# Patient Record
Sex: FEMALE | Race: ASIAN | NOT HISPANIC OR LATINO | Employment: OTHER | URBAN - METROPOLITAN AREA
[De-identification: names, ages, dates, MRNs, and addresses within clinical notes are randomized per-mention and may not be internally consistent; named-entity substitution may affect disease eponyms.]

---

## 2022-02-20 ENCOUNTER — APPOINTMENT (EMERGENCY)
Dept: RADIOLOGY | Facility: HOSPITAL | Age: 77
End: 2022-02-20
Payer: MEDICARE

## 2022-02-20 ENCOUNTER — HOSPITAL ENCOUNTER (EMERGENCY)
Facility: HOSPITAL | Age: 77
Discharge: HOME | End: 2022-02-20
Attending: EMERGENCY MEDICINE | Admitting: EMERGENCY MEDICINE
Payer: MEDICARE

## 2022-02-20 VITALS
RESPIRATION RATE: 18 BRPM | HEART RATE: 101 BPM | TEMPERATURE: 99.8 F | SYSTOLIC BLOOD PRESSURE: 138 MMHG | WEIGHT: 120 LBS | DIASTOLIC BLOOD PRESSURE: 72 MMHG | OXYGEN SATURATION: 97 %

## 2022-02-20 DIAGNOSIS — K92.1 HEMATOCHEZIA: Primary | ICD-10-CM

## 2022-02-20 DIAGNOSIS — K52.9 COLITIS: ICD-10-CM

## 2022-02-20 DIAGNOSIS — R19.7 DIARRHEA: ICD-10-CM

## 2022-02-20 DIAGNOSIS — R10.32 LEFT LOWER QUADRANT ABDOMINAL PAIN: ICD-10-CM

## 2022-02-20 DIAGNOSIS — R10.84 GENERALIZED ABDOMINAL PAIN: ICD-10-CM

## 2022-02-20 LAB
ALBUMIN SERPL BCP-MCNC: 3.9 G/DL (ref 3.5–5)
ALP SERPL-CCNC: 66 U/L (ref 46–116)
ALT SERPL W P-5'-P-CCNC: 24 U/L (ref 12–78)
ANION GAP SERPL CALCULATED.3IONS-SCNC: 6 MMOL/L (ref 4–13)
AST SERPL W P-5'-P-CCNC: 16 U/L (ref 5–45)
BASOPHILS # BLD AUTO: 0.03 THOUSANDS/ΜL (ref 0–0.1)
BASOPHILS NFR BLD AUTO: 0 % (ref 0–1)
BILIRUB SERPL-MCNC: 0.95 MG/DL (ref 0.2–1)
BUN SERPL-MCNC: 16 MG/DL (ref 5–25)
CALCIUM SERPL-MCNC: 9.6 MG/DL (ref 8.3–10.1)
CHLORIDE SERPL-SCNC: 101 MMOL/L (ref 100–108)
CO2 SERPL-SCNC: 29 MMOL/L (ref 21–32)
CREAT SERPL-MCNC: 0.79 MG/DL (ref 0.6–1.3)
EOSINOPHIL # BLD AUTO: 0.01 THOUSAND/ΜL (ref 0–0.61)
EOSINOPHIL NFR BLD AUTO: 0 % (ref 0–6)
ERYTHROCYTE [DISTWIDTH] IN BLOOD BY AUTOMATED COUNT: 12.5 % (ref 11.6–15.1)
GFR SERPL CREATININE-BSD FRML MDRD: 73 ML/MIN/1.73SQ M
GLUCOSE SERPL-MCNC: 167 MG/DL (ref 65–140)
HCT VFR BLD AUTO: 42.9 % (ref 34.8–46.1)
HGB BLD-MCNC: 14.4 G/DL (ref 11.5–15.4)
IMM GRANULOCYTES # BLD AUTO: 0.05 THOUSAND/UL (ref 0–0.2)
IMM GRANULOCYTES NFR BLD AUTO: 0 % (ref 0–2)
LIPASE SERPL-CCNC: 81 U/L (ref 73–393)
LYMPHOCYTES # BLD AUTO: 1.41 THOUSANDS/ΜL (ref 0.6–4.47)
LYMPHOCYTES NFR BLD AUTO: 10 % (ref 14–44)
MCH RBC QN AUTO: 30.6 PG (ref 26.8–34.3)
MCHC RBC AUTO-ENTMCNC: 33.6 G/DL (ref 31.4–37.4)
MCV RBC AUTO: 91 FL (ref 82–98)
MONOCYTES # BLD AUTO: 1.12 THOUSAND/ΜL (ref 0.17–1.22)
MONOCYTES NFR BLD AUTO: 8 % (ref 4–12)
NEUTROPHILS # BLD AUTO: 11.78 THOUSANDS/ΜL (ref 1.85–7.62)
NEUTS SEG NFR BLD AUTO: 82 % (ref 43–75)
NRBC BLD AUTO-RTO: 0 /100 WBCS
PLATELET # BLD AUTO: 223 THOUSANDS/UL (ref 149–390)
PMV BLD AUTO: 10.4 FL (ref 8.9–12.7)
POTASSIUM SERPL-SCNC: 3.4 MMOL/L (ref 3.5–5.3)
PROT SERPL-MCNC: 7.3 G/DL (ref 6.4–8.2)
RBC # BLD AUTO: 4.71 MILLION/UL (ref 3.81–5.12)
SODIUM SERPL-SCNC: 136 MMOL/L (ref 136–145)
WBC # BLD AUTO: 14.4 THOUSAND/UL (ref 4.31–10.16)

## 2022-02-20 PROCEDURE — 99284 EMERGENCY DEPT VISIT MOD MDM: CPT | Performed by: EMERGENCY MEDICINE

## 2022-02-20 PROCEDURE — 85025 COMPLETE CBC W/AUTO DIFF WBC: CPT | Performed by: EMERGENCY MEDICINE

## 2022-02-20 PROCEDURE — 96361 HYDRATE IV INFUSION ADD-ON: CPT

## 2022-02-20 PROCEDURE — 36415 COLL VENOUS BLD VENIPUNCTURE: CPT | Performed by: EMERGENCY MEDICINE

## 2022-02-20 PROCEDURE — 74177 CT ABD & PELVIS W/CONTRAST: CPT

## 2022-02-20 PROCEDURE — 99284 EMERGENCY DEPT VISIT MOD MDM: CPT

## 2022-02-20 PROCEDURE — 80053 COMPREHEN METABOLIC PANEL: CPT | Performed by: EMERGENCY MEDICINE

## 2022-02-20 PROCEDURE — 96374 THER/PROPH/DIAG INJ IV PUSH: CPT

## 2022-02-20 PROCEDURE — 83690 ASSAY OF LIPASE: CPT | Performed by: EMERGENCY MEDICINE

## 2022-02-20 PROCEDURE — G1004 CDSM NDSC: HCPCS

## 2022-02-20 RX ORDER — DICYCLOMINE HCL 20 MG
20 TABLET ORAL ONCE
Status: COMPLETED | OUTPATIENT
Start: 2022-02-20 | End: 2022-02-20

## 2022-02-20 RX ORDER — KETOROLAC TROMETHAMINE 30 MG/ML
15 INJECTION, SOLUTION INTRAMUSCULAR; INTRAVENOUS ONCE
Status: COMPLETED | OUTPATIENT
Start: 2022-02-20 | End: 2022-02-20

## 2022-02-20 RX ORDER — DICYCLOMINE HCL 20 MG
20 TABLET ORAL 2 TIMES DAILY
Qty: 20 TABLET | Refills: 0 | Status: SHIPPED | OUTPATIENT
Start: 2022-02-20

## 2022-02-20 RX ADMIN — DICYCLOMINE HYDROCHLORIDE 20 MG: 20 TABLET ORAL at 16:19

## 2022-02-20 RX ADMIN — SODIUM CHLORIDE 1000 ML: 0.9 INJECTION, SOLUTION INTRAVENOUS at 16:20

## 2022-02-20 RX ADMIN — KETOROLAC TROMETHAMINE 15 MG: 30 INJECTION, SOLUTION INTRAMUSCULAR at 16:20

## 2022-02-20 RX ADMIN — IOHEXOL 80 ML: 350 INJECTION, SOLUTION INTRAVENOUS at 16:33

## 2022-02-20 NOTE — ED PROVIDER NOTES
History  Chief Complaint   Patient presents with    Abdominal Pain     abdominal pain started last night after chinese takeout, also with diarrhea with blood in the stool     41-year-old female history of hyperlipidemia presenting with abdominal pain and bloody diarrhea  Patient reports symptoms began last night after eating takeout  Patient reports frequent liquid stool with accompanied bright red blood of moderate amount  Denies any rectal pain  Endorses generalized abdominal discomfort that she describes as cramping  Most pain is in the left lower quadrant  Denies previous history of rectal bleeding and is not on antiplatelet or anticoagulant medications  Denies any nausea or vomiting  Denies any recent antibiotic use or abnormal water sources  Denies any other complaints  Denies any headache, vision changes, chest pain, shortness of breath, fever/chills, or any bladder changes  None       Past Medical History:   Diagnosis Date    High cholesterol        History reviewed  No pertinent surgical history  History reviewed  No pertinent family history  I have reviewed and agree with the history as documented  E-Cigarette/Vaping    E-Cigarette Use Never User      E-Cigarette/Vaping Substances     Social History     Tobacco Use    Smoking status: Never Smoker    Smokeless tobacco: Never Used   Vaping Use    Vaping Use: Never used   Substance Use Topics    Alcohol use: Never    Drug use: Never        Review of Systems   Constitutional: Negative for appetite change, chills, diaphoresis, fever and unexpected weight change  HENT: Negative for congestion and rhinorrhea  Eyes: Negative for photophobia and visual disturbance  Respiratory: Negative for cough, chest tightness and shortness of breath  Cardiovascular: Negative for chest pain, palpitations and leg swelling  Gastrointestinal: Positive for abdominal pain, blood in stool and diarrhea   Negative for abdominal distention, constipation, nausea and vomiting  Genitourinary: Negative for dysuria and hematuria  Musculoskeletal: Negative for back pain, joint swelling, neck pain and neck stiffness  Skin: Negative for color change, pallor, rash and wound  Neurological: Negative for dizziness, syncope, weakness, light-headedness and headaches  Psychiatric/Behavioral: Negative for agitation  All other systems reviewed and are negative  Physical Exam  ED Triage Vitals [02/20/22 1526]   Temperature Pulse Respirations Blood Pressure SpO2   99 8 °F (37 7 °C) 101 18 138/72 97 %      Temp Source Heart Rate Source Patient Position - Orthostatic VS BP Location FiO2 (%)   Temporal -- -- -- --      Pain Score       4             Orthostatic Vital Signs  Vitals:    02/20/22 1526   BP: 138/72   Pulse: 101       Physical Exam  Vitals and nursing note reviewed  Constitutional:       General: She is not in acute distress  Appearance: Normal appearance  She is well-developed  She is not ill-appearing, toxic-appearing or diaphoretic  HENT:      Head: Normocephalic and atraumatic  Nose: Nose normal  No congestion or rhinorrhea  Mouth/Throat:      Mouth: Mucous membranes are moist       Pharynx: Oropharynx is clear  No oropharyngeal exudate or posterior oropharyngeal erythema  Eyes:      General: No scleral icterus  Right eye: No discharge  Left eye: No discharge  Extraocular Movements: Extraocular movements intact  Conjunctiva/sclera: Conjunctivae normal       Pupils: Pupils are equal, round, and reactive to light  Neck:      Vascular: No JVD  Trachea: No tracheal deviation  Cardiovascular:      Rate and Rhythm: Normal rate and regular rhythm  Heart sounds: Normal heart sounds  No murmur heard  No friction rub  No gallop  Comments: Normal rate and regular rhythm  Pulmonary:      Effort: Pulmonary effort is normal  No respiratory distress  Breath sounds: Normal breath sounds  No stridor  No wheezing or rales  Comments: Clear to auscultation bilaterally  Chest:      Chest wall: No tenderness  Abdominal:      General: Bowel sounds are normal  There is no distension  Palpations: Abdomen is soft  Tenderness: There is generalized abdominal tenderness and tenderness in the right lower quadrant  There is no right CVA tenderness, left CVA tenderness, guarding or rebound  Comments: Generalized tenderness palpation predominantly left lower quadrant with some guarding  Otherwise soft and nondistended  Hyperactive bowel sounds throughout  Genitourinary:     Rectum: Normal  Guaiac result negative  No mass or tenderness  Musculoskeletal:         General: No swelling, tenderness, deformity or signs of injury  Normal range of motion  Cervical back: Normal range of motion and neck supple  No rigidity  No muscular tenderness  Right lower leg: No edema  Left lower leg: No edema  Lymphadenopathy:      Cervical: No cervical adenopathy  Skin:     General: Skin is warm and dry  Coloration: Skin is not pale  Findings: No erythema or rash  Neurological:      General: No focal deficit present  Mental Status: She is alert and oriented to person, place, and time  Mental status is at baseline  Cranial Nerves: No cranial nerve deficit  Sensory: No sensory deficit  Motor: No weakness or abnormal muscle tone  Coordination: Coordination normal       Gait: Gait normal       Comments: A&Ox3 to person, place, and time  CN 2-12 intact  Strength 5/5 throughout  Sensation grossly intact  Cerebellar exam including gait intact  Psychiatric:         Behavior: Behavior normal          Thought Content:  Thought content normal          ED Medications  Medications   sodium chloride 0 9 % bolus 1,000 mL (1,000 mL Intravenous New Bag 2/20/22 1620)   dicyclomine (BENTYL) tablet 20 mg (20 mg Oral Given 2/20/22 1619)   ketorolac (TORADOL) injection 15 mg (15 mg Intravenous Given 2/20/22 1620)   iohexol (OMNIPAQUE) 350 MG/ML injection (SINGLE-DOSE) 80 mL (80 mL Intravenous Given 2/20/22 1633)       Diagnostic Studies  Results Reviewed     Procedure Component Value Units Date/Time    Comprehensive metabolic panel [[id number]]  (Abnormal) Collected: 02/20/22 1619    Lab Status: Final result Specimen: Blood from Arm, Right Updated: 02/20/22 1658     Sodium 136 mmol/L      Potassium 3 4 mmol/L      Chloride 101 mmol/L      CO2 29 mmol/L      ANION GAP 6 mmol/L      BUN 16 mg/dL      Creatinine 0 79 mg/dL      Glucose 167 mg/dL      Calcium 9 6 mg/dL      AST 16 U/L      ALT 24 U/L      Alkaline Phosphatase 66 U/L      Total Protein 7 3 g/dL      Albumin 3 9 g/dL      Total Bilirubin 0 95 mg/dL      eGFR 73 ml/min/1 73sq m     Narrative:      [place] guidelines for Chronic Kidney Disease (CKD):     Stage 1 with normal or high GFR (GFR > 90 mL/min/1 73 square meters)    Stage 2 Mild CKD (GFR = 60-89 mL/min/1 73 square meters)    Stage 3A Moderate CKD (GFR = 45-59 mL/min/1 73 square meters)    Stage 3B Moderate CKD (GFR = 30-44 mL/min/1 73 square meters)    Stage 4 Severe CKD (GFR = 15-29 mL/min/1 73 square meters)    Stage 5 End Stage CKD (GFR <15 mL/min/1 73 square meters)  Note: GFR calculation is accurate only with a steady state creatinine    Lipase [[id number]]  (Normal) Collected: 02/20/22 1619    Lab Status: Final result Specimen: Blood from Arm, Right Updated: 02/20/22 1658     Lipase 81 u/L     CBC and differential [[id number]]  (Abnormal) Collected: 02/20/22 1619    Lab Status: Final result Specimen: Blood from Arm, Right Updated: 02/20/22 1629     WBC 14 40 Thousand/uL      RBC 4 71 Million/uL      Hemoglobin 14 4 g/dL      Hematocrit 42 9 %      MCV 91 fL      MCH 30 6 pg      MCHC 33 6 g/dL      RDW 12 5 %      MPV 10 4 fL      Platelets 964 Thousands/uL      nRBC 0 /100 WBCs      Neutrophils Relative 82 %      Immat GRANS % 0 % Lymphocytes Relative 10 %      Monocytes Relative 8 %      Eosinophils Relative 0 %      Basophils Relative 0 %      Neutrophils Absolute 11 78 Thousands/µL      Immature Grans Absolute 0 05 Thousand/uL      Lymphocytes Absolute 1 41 Thousands/µL      Monocytes Absolute 1 12 Thousand/µL      Eosinophils Absolute 0 01 Thousand/µL      Basophils Absolute 0 03 Thousands/µL     Stool Enteric Bacterial Panel by PCR [[id number]]     Lab Status: No result Specimen: Stool                  CT abdomen pelvis with contrast   Final Result by Jase Ayala DO (02/20 1708)      Descending colitis  The study was marked in [facility] for immediate notification  Workstation performed: [id number]               Procedures  Procedures      ED Course               Identification of Seniors at Risk      Most Recent Value   (ISAR) Identification of Seniors at Risk    Before the illness or injury that brought you to the Emergency, did you need someone to help you on a regular basis? 0 Filed at: 02/20/2022 1530   In the last 24 hours, have you needed more help than usual? 0 Filed at: 02/20/2022 1530   Have you been hospitalized for one or more nights during the past 6 months? 0 Filed at: 02/20/2022 1530   In general, do you see well? 0 Filed at: 02/20/2022 1530   In general, do you have serious problems with your memory? 0 Filed at: 02/20/2022 1530   Do you take more than three different medications every day? 0 Filed at: 02/20/2022 1530   ISAR Score 0 Filed at: 02/20/2022 1530                 Initial Sepsis Screening     Row Name 02/20/22 1706                Is the patient's history suggestive of a new or worsening infection?  No  -CE        Suspected source of infection --        Are two or more of the following signs & symptoms of infection both present and new to the patient? --        Indicate SIRS criteria --        If the answer is yes to both questions, suspicion of sepsis is present --        If severe sepsis is present AND tissue hypoperfusion perists in the hour after fluid resuscitation or lactate > 4, the patient meets criteria for SEPTIC SHOCK --        Are any of the following organ dysfunction criteria present within 6 hours of suspected infection and SIRS criteria that are NOT considered to be chronic conditions? --        Organ dysfunction --        Date of presentation of severe sepsis --        Time of presentation of severe sepsis --        Tissue hypoperfusion persists in the hour after crystalloid fluid administration, evidenced, by either: --        Was hypotension present within one hour of the conclusion of crystalloid fluid administration? --        Date of presentation of septic shock --        Time of presentation of septic shock --              User Key  (r) = Recorded By, (t) = Taken By, (c) = Cosigned By    [address] Name Provider Ji Lion MD Resident                SBIRT 22yo+      Most Recent Value   SBIRT (25 yo +)    In order to provide better care to our patients, we are screening all of our patients for alcohol and drug use  Would it be okay to ask you these screening questions? Yes Filed at: 02/20/2022 1613   Initial Alcohol Screen: US AUDIT-C     1  How often do you have a drink containing alcohol? 0 Filed at: 02/20/2022 1613   2  How many drinks containing alcohol do you have on a typical day you are drinking? 0 Filed at: 02/20/2022 1613   3a  Male UNDER 65: How often do you have five or more drinks on one occasion? 0 Filed at: 02/20/2022 1613   3b  FEMALE Any Age, or MALE 65+: How often do you have 4 or more drinks on one occassion? 0 Filed at: 02/20/2022 1613   Audit-C Score 0 Filed at: 02/20/2022 1613   JENNIFER: How many times in the past year have you    Used an illegal drug or used a prescription medication for non-medical reasons?  Never Filed at: 02/20/2022 1613                MDM  Number of Diagnoses or Management Options  Colitis  Diarrhea  Generalized abdominal pain  Hematochezia  Left lower quadrant abdominal pain  Diagnosis management comments: 67-year-old female history of hyperlipidemia presenting with abdominal pain and bloody diarrhea  Possible colitis verses infection versus rectal pathology  Normal rectal exam   Plan for labs including abdominal labs  IV fluids and IV Toradol and oral Bentyl  CT abdomen and pelvis  Stool PCR  Reassess  Improved after medication  Labs no acute process  CT with nonspecific colitis  Unable to collect stool sample  Discussed results recommendations  Advised follow-up primary care provider  Medication recommendations and prescriptions at the pharmacy  Given instructions return precautions  Patient and daughter at bedside acknowledged understanding of all written and verbal instructions and return precautions  Discharge         Amount and/or Complexity of Data Reviewed  Clinical lab tests: reviewed and ordered  Tests in the radiology section of CPT®: reviewed and ordered  Tests in the medicine section of CPT®: reviewed and ordered  Review and summarize past medical records: yes  Discuss the patient with other providers: yes  Independent visualization of images, tracings, or specimens: yes    Risk of Complications, Morbidity, and/or Mortality  Presenting problems: moderate  Diagnostic procedures: moderate  Management options: moderate    Patient Progress  Patient progress: improved      Disposition  Final diagnoses:   Hematochezia   Diarrhea   Generalized abdominal pain   Left lower quadrant abdominal pain   Colitis     Time reflects when diagnosis was documented in both MDM as applicable and the Disposition within this note     Time User Action Codes Description Comment    2/20/2022  4:30 PM Nirali Pedersenath Add [K92 1] Hematochezia     2/20/2022  4:30 PM Nirali Pedersenath Add [R19 7] Diarrhea     2/20/2022  4:30 PM Nirali Donath Add [R10 84] Generalized abdominal pain     2/20/2022  4:31 PM Nirali Donath Add [R10 32] Left lower quadrant abdominal pain     2/20/2022  5:17 PM Dara Escobar Add [K52 9] Colitis       ED Disposition     ED Disposition Condition Date/Time Comment    Discharge Stable Sun Feb 20, 2022  5:17 PM [facility] discharge to home/self care  Follow-up Information     Follow up With Specialties Details Why [id number]  Schedule an appointment as soon as possible for a visit  [phone]  [facility] [address]          Patient's Medications   Discharge Prescriptions    DICYCLOMINE (BENTYL) 20 MG TABLET    Take 1 tablet (20 mg total) by mouth 2 (two) times a day       Start Date: 2/20/2022 End Date: --       Order Dose: 20 mg       Quantity: 20 tablet    Refills: 0     No discharge procedures on file  PDMP Review     None           ED Provider  Attending physically available and evaluated [facility]  I managed the patient along with the ED Attending      Electronically Signed by         Tessie Monroe MD  02/20/22 4835

## 2022-02-20 NOTE — ED ATTENDING ATTESTATION
2/20/2022  IMark MD, saw and evaluated the patient  I have discussed the patient with the resident/non-physician practitioner and agree with the resident's/non-physician practitioner's findings, Plan of Care, and MDM as documented in the resident's/non-physician practitioner's note, except where noted  All available labs and Radiology studies were reviewed  I was present for key portions of any procedure(s) performed by the resident/non-physician practitioner and I was immediately available to provide assistance  At this point I agree with the current assessment done in the Emergency Department  I have conducted an independent evaluation of this patient a history and physical is as follows:    ED Course         Critical Care Time  Procedures    78 yo female with diarrhea and bloody stools started last night after eating chinese food  No other sick contacts, no n/v, no fever  Pt with colonoscopy two years ago with no acute findings  Pt with no back pain, no urinary compliants  Vss, afebrile, lungs cta, rrr, abdomen soft tender llq, no rebound, no guarding  Labs, ct a/p  Pain meds

## 2022-02-20 NOTE — DISCHARGE INSTRUCTIONS
Please follow-up primary care provider  Recommend Bentyl as needed according to medication instructions for abdominal pain  Can also consider Tylenol 650 mg every 6 hours as needed for pain  Please return for fainting, severe pain, or any other concerning signs or symptoms  Please refer to following documents for additional instructions and return precautions